# Patient Record
Sex: MALE | Race: WHITE | NOT HISPANIC OR LATINO | Employment: UNEMPLOYED | URBAN - METROPOLITAN AREA
[De-identification: names, ages, dates, MRNs, and addresses within clinical notes are randomized per-mention and may not be internally consistent; named-entity substitution may affect disease eponyms.]

---

## 2017-01-31 ENCOUNTER — HOSPITAL ENCOUNTER (EMERGENCY)
Facility: HOSPITAL | Age: 62
Discharge: HOME | End: 2017-01-31
Attending: EMERGENCY MEDICINE
Payer: MEDICARE

## 2017-01-31 VITALS
OXYGEN SATURATION: 100 % | RESPIRATION RATE: 12 BRPM | TEMPERATURE: 98 F | WEIGHT: 170 LBS | DIASTOLIC BLOOD PRESSURE: 80 MMHG | BODY MASS INDEX: 25.76 KG/M2 | HEART RATE: 82 BPM | SYSTOLIC BLOOD PRESSURE: 128 MMHG | HEIGHT: 68 IN

## 2017-01-31 DIAGNOSIS — S09.90XA CLOSED HEAD INJURY: ICD-10-CM

## 2017-01-31 DIAGNOSIS — S00.83XA CONTUSION OF FACE, INITIAL ENCOUNTER: Primary | ICD-10-CM

## 2017-01-31 PROBLEM — F10.20 ALCOHOL USE DISORDER, SEVERE, DEPENDENCE: Status: ACTIVE | Noted: 2017-01-31

## 2017-01-31 PROBLEM — F10.929 ALCOHOLIC INTOXICATION WITH COMPLICATION: Status: ACTIVE | Noted: 2017-01-31

## 2017-01-31 LAB
ALBUMIN SERPL BCP-MCNC: 4.3 G/DL
ALP SERPL-CCNC: 58 U/L
ALT SERPL W/O P-5'-P-CCNC: 12 U/L
ANION GAP SERPL CALC-SCNC: 10 MMOL/L
AST SERPL-CCNC: 25 U/L
BASOPHILS # BLD AUTO: 0.04 K/UL
BASOPHILS NFR BLD: 0.6 %
BILIRUB SERPL-MCNC: 0.3 MG/DL
BUN SERPL-MCNC: 12 MG/DL
CALCIUM SERPL-MCNC: 9 MG/DL
CHLORIDE SERPL-SCNC: 107 MMOL/L
CO2 SERPL-SCNC: 26 MMOL/L
CREAT SERPL-MCNC: 0.9 MG/DL
DIFFERENTIAL METHOD: ABNORMAL
EOSINOPHIL # BLD AUTO: 0.4 K/UL
EOSINOPHIL NFR BLD: 6.6 %
ERYTHROCYTE [DISTWIDTH] IN BLOOD BY AUTOMATED COUNT: 12.5 %
EST. GFR  (AFRICAN AMERICAN): >60 ML/MIN/1.73 M^2
EST. GFR  (NON AFRICAN AMERICAN): >60 ML/MIN/1.73 M^2
GLUCOSE SERPL-MCNC: 88 MG/DL
HCT VFR BLD AUTO: 49.5 %
HGB BLD-MCNC: 16.9 G/DL
LYMPHOCYTES # BLD AUTO: 2.1 K/UL
LYMPHOCYTES NFR BLD: 31.3 %
MAGNESIUM SERPL-MCNC: 2.2 MG/DL
MCH RBC QN AUTO: 33.1 PG
MCHC RBC AUTO-ENTMCNC: 34.1 %
MCV RBC AUTO: 97 FL
MONOCYTES # BLD AUTO: 0.3 K/UL
MONOCYTES NFR BLD: 4.5 %
NEUTROPHILS # BLD AUTO: 3.8 K/UL
NEUTROPHILS NFR BLD: 57 %
PLATELET # BLD AUTO: 204 K/UL
PMV BLD AUTO: 11.1 FL
POTASSIUM SERPL-SCNC: 4.5 MMOL/L
PROT SERPL-MCNC: 8.1 G/DL
RBC # BLD AUTO: 5.1 M/UL
SODIUM SERPL-SCNC: 143 MMOL/L
WBC # BLD AUTO: 6.67 K/UL

## 2017-01-31 PROCEDURE — 96365 THER/PROPH/DIAG IV INF INIT: CPT

## 2017-01-31 PROCEDURE — 63600175 PHARM REV CODE 636 W HCPCS: Performed by: EMERGENCY MEDICINE

## 2017-01-31 PROCEDURE — 90715 TDAP VACCINE 7 YRS/> IM: CPT | Performed by: EMERGENCY MEDICINE

## 2017-01-31 PROCEDURE — 85025 COMPLETE CBC W/AUTO DIFF WBC: CPT

## 2017-01-31 PROCEDURE — 99284 EMERGENCY DEPT VISIT MOD MDM: CPT | Mod: 25

## 2017-01-31 PROCEDURE — 96372 THER/PROPH/DIAG INJ SC/IM: CPT

## 2017-01-31 PROCEDURE — 83735 ASSAY OF MAGNESIUM: CPT

## 2017-01-31 PROCEDURE — 80053 COMPREHEN METABOLIC PANEL: CPT

## 2017-01-31 PROCEDURE — 25000003 PHARM REV CODE 250: Performed by: EMERGENCY MEDICINE

## 2017-01-31 PROCEDURE — 90792 PSYCH DIAG EVAL W/MED SRVCS: CPT | Mod: GC,,, | Performed by: PSYCHIATRY & NEUROLOGY

## 2017-01-31 PROCEDURE — 90471 IMMUNIZATION ADMIN: CPT | Performed by: EMERGENCY MEDICINE

## 2017-01-31 PROCEDURE — 96366 THER/PROPH/DIAG IV INF ADDON: CPT

## 2017-01-31 RX ORDER — DIAZEPAM 10 MG/2ML
5 INJECTION INTRAMUSCULAR
Status: COMPLETED | OUTPATIENT
Start: 2017-01-31 | End: 2017-01-31

## 2017-01-31 RX ADMIN — DIAZEPAM 5 MG: 5 INJECTION, SOLUTION INTRAMUSCULAR; INTRAVENOUS at 12:01

## 2017-01-31 RX ADMIN — BACITRACIN, NEOMYCIN, POLYMYXIN B 1 EACH: 400; 3.5; 5 OINTMENT TOPICAL at 12:01

## 2017-01-31 RX ADMIN — CLOSTRIDIUM TETANI TOXOID ANTIGEN (FORMALDEHYDE INACTIVATED), CORYNEBACTERIUM DIPHTHERIAE TOXOID ANTIGEN (FORMALDEHYDE INACTIVATED), BORDETELLA PERTUSSIS TOXOID ANTIGEN (GLUTARALDEHYDE INACTIVATED), BORDETELLA PERTUSSIS FILAMENTOUS HEMAGGLUTININ ANTIGEN (FORMALDEHYDE INACTIVATED), BORDETELLA PERTUSSIS PERTACTIN ANTIGEN, AND BORDETELLA PERTUSSIS FIMBRIAE 2/3 ANTIGEN 0.5 ML: 5; 2; 2.5; 5; 3; 5 INJECTION, SUSPENSION INTRAMUSCULAR at 12:01

## 2017-01-31 RX ADMIN — FOLIC ACID: 5 INJECTION, SOLUTION INTRAMUSCULAR; INTRAVENOUS; SUBCUTANEOUS at 12:01

## 2017-01-31 NOTE — ED PROVIDER NOTES
Encounter Date: 1/31/2017    SCRIBE #1 NOTE: I, Hosea Yesenia, am scribing for, and in the presence of, Ramana Cox MD. Other sections scribed: HPI, ROS.       History     Chief Complaint   Patient presents with    Fall     arrived via N.[facility], called to home for c/o fall, EMS reports intoxicated, bizarre behavior, repeating self, hostile, hx Schizo     Review of patient's allergies indicates:  No Known Allergies  HPI Comments: CC: Fall  HPI: This 61 y.o. male with schizophrenia and Hx of alcohol abuse presents to the ED via EMS for evaluation s/p fall at home this morning. Pt states that he is an alcoholic and got drunk this morning; last drink a few hours ago. Pt states he fell and lost consciousness, hitting R eyebrow and giving himself an abrasion. Pt reports moderate associated pain as well as R upper thoracic back pain from the fall. EMS placed pt in c-collar. EMS reports pt has been displaying strange behavior this morning. He states he is up to date on his tetanus immunization. He denies any chest pain, SOB, abdominal pain, N/V.      The history is provided by the patient and the EMS personnel.     Past Medical History   Diagnosis Date    History of psychiatric hospitalization     Hx of psychiatric care     Psychiatric problem     Suicide attempt     Therapy      No past medical history pertinent negatives.  No past surgical history on file.  History reviewed. No pertinent family history.  Social History   Substance Use Topics    Smoking status: Current Every Day Smoker    Smokeless tobacco: None    Alcohol use Yes      Comment: Heavy, all day drinker; will not quantify how much he drinks; many ED visits for intoxication     Review of Systems   Constitutional: Negative for chills and fever.   HENT: Negative for congestion.         (+) R sided facial pain   Eyes: Negative for pain and visual disturbance.   Respiratory: Negative for cough and shortness of breath.    Cardiovascular: Negative for chest  pain.   Gastrointestinal: Negative for abdominal pain, nausea and vomiting.   Genitourinary: Negative for dysuria and flank pain.   Musculoskeletal: Positive for back pain (R upper thoracic).   Skin: Positive for wound (abrasion to R eyebrow).   Neurological: Positive for syncope.   Psychiatric/Behavioral: Positive for behavioral problems.       Physical Exam   Initial Vitals   BP Pulse Resp Temp SpO2   01/31/17 1138 01/31/17 1138 01/31/17 1138 01/31/17 1138 01/31/17 1138   131/82 110 20 98.3 °F (36.8 °C) 100 %     Physical Exam  The patient was examined specifically for the following:   General:No significant distress, Good color, Warm and dry. Head and neck:Scalp atraumatic, Neck supple. Neurological:Appropriate conversation, Gross motor deficits. Eyes:Conjugate gaze, Clear corneas. ENT: No epistaxis. Cardiac: Regular rate and rhythm, Grossly normal heart tones. Pulmonary: Wheezing, Rales. Gastrointestinal: Abdominal tenderness, Abdominal distention. Musculoskeletal: Extremity deformity, Apparent pain with range of motion of the joints. Skin: Rash.   The findings on examination were normal except for the following: Patient has a superficial abrasion of the right brow.  There is a small amount of crusted blood there.  The patient's heart rate in the 110.  The patient is awake alert bright.  He is appropriate in conversation.  He does not appear to be somnolent or lethargic.  Mental status examination, cranial nerves, motor and sensory examination are normal.  There is no midline cervical tenderness.  The patient is wearing a cervical collar.  The patient was cleared clinically.  There is no extremity chest abdomen pelvis or spinal tenderness.  ED Course   Procedures  Labs Reviewed   CBC W/ AUTO DIFFERENTIAL - Abnormal; Notable for the following:        Result Value    MCH 33.1 (*)     All other components within normal limits   COMPREHENSIVE METABOLIC PANEL   MAGNESIUM         Medical decision making: Given the  above, this patient fell striking his head with loss of consciousness.  The patient had no midline cervical tenderness.  He was awake alert and bright in the emergency room.  He did not appear to be psychotic suicidal or homicidal.  The patient was not somnolent.  He is in no apparent distress.  There is no evidence of significant chest abdomen extremity or spinal trauma.  I will discharge this patient outpatient evaluation and treatment.  His magnesium is normal.  He is an alcoholic.  He was treated with IV multivitamins.                  Scribe Attestation:   Scribe #1: I performed the above scribed service and the documentation accurately describes the services I performed. I attest to the accuracy of the note.    Attending Attestation:           Physician Attestation for Scribe:  Physician Attestation Statement for Scribe #1: I, Ramana Cox MD, reviewed documentation, as scribed by Hosea Patterson in my presence, and it is both accurate and complete.                 ED Course     Clinical Impression:   The primary encounter diagnosis was Contusion of face, initial encounter. A diagnosis of Closed head injury was also pertinent to this visit.          Ramana Cox MD  02/03/17 9977

## 2017-01-31 NOTE — DISCHARGE INSTRUCTIONS
Please return immediately if you get worse or if new problems develop.  Rest.  Please make an appointment to see a primary care doctor this week.  Please try to gradually reduce your alcohol consumption.

## 2017-01-31 NOTE — CONSULTS
"[facility]  Psychiatry  Consult Note    Patient Name: Timoteo Murrell  MRN: [id number]   Code Status: No Order  Admission Date: 1/31/2017  Hospital Length of Stay: 0 days  Attending Physician: Ramana Cox MD  Primary Care Provider: Primary Doctor No    Current Legal Status: N/A    Patient information was obtained from patient and ER records.   Consults  Subjective:     Principal Problem:<principal problem not specified>    Chief Complaint:  Fall at home     HPI: Patient Timoteo Murrell presents to clinic after a fall.  He says that he brought himself in but somehow, EMS was called.  Patient is currently a difficult and poor historian and is clinically intoxicated.  He is rude at times and not answering questions.  States that he drinks but will not quantify.  States that he tripped at home "over Antabuse".  He admits to being depressed but again will not quantify.  Sleep is poor.  Denies any previous hien.  Admits to being an anxious person.  Denies any psychosis.  States that he tried to cut his wrist in the past and if I don't stop asking him questions "I'll but it again".  When asked again, he states that he is not suicidal and does not want to go to a psychiatric facility.  States that he was last hospitalized at [facility] in [place] (which changed names and privatized a few years ago).  Does not want rehab or help with drinking.  No desire to quit.  States that he follows up with [facility] and sees Dr. Alcantara.  The only medication that he can recall taking is Antabuse (likely not compliant).  Oriented to name and "hospital".  Not able to provide month or year.  Only able to provide month of his birth.    Patient History      Medical Past Medical History Date Comments Source    as of 1/31/2017 History of psychiatric hospitalization   Provider    Hx of psychiatric care   Provider    Psychiatric problem   Provider    Suicide attempt   Provider    Therapy   Provider    " "     Surgical    Past Surgical History    Patient provided no pertinent surgical history.      Family **None**    as of 1/31/2017      Tobacco Use Smoking Status Source Types Packs/day Years Used Comments Smoking Start Date Smoking Quit Date Smokeless Tobacco Status Smokeless Tobacco Quit Date    as of 1/31/2017 Current Every Day Smoker Provider          Unknown       Alcohol Use Alcohol Use Source Drinks/Week Alcohol/Wk Comments    as of 1/31/2017 Yes Provider    Heavy, all day drinker; will not quantify how much he drinks; many ED visits for intoxication      Drug Use Drug Use Source Types Frequency Comments    as of 1/31/2017 No Provider     denies      Sexual Activity Sexually Active Source Birth Control Partners Comments    as of 1/31/2017  Provider           Social ADL ADL Question Response Comments Source    as of 1/31/2017 Patient feels they ought to cut down on drinking/drug use Not Asked  Provider    Patient annoyed by others criticizing their drinking/drug use Not Asked  Provider    Patient has felt bad or guilty about drinking/drug use Not Asked  Provider    Patient has had a drink/used drugs as an eye opener in the AM Not Asked  Provider      Social Doc **None**    as of 1/31/2017      Occupational Occupation Employer Comments Source    as of 1/31/2017 ""   Provider      Socioeconomic Marital Status Spouse Name Num of Children Years Education Source    as of 1/31/2017 Single    Provider    Preferred Language Ethnicity Race    English /White White      Additional Pertinent History Q A Comments    as of 1/31/2017 Place in Birth [place]      Number of Siblings      Raised by      Childhood Trauma      Financial Status disabled     Highest Level of Education      Lives with alone possibly group home    Lives in group home     Criminal History of      Legal Involvement      Does patient have access to a firearm?       Service      Primary Leisure Activity      Spirituality " "non-practicing     Caffeine Use clinically insignificant        Review of patient's allergies indicates:  No Known Allergies    No current facility-administered medications on file prior to encounter.      Current Outpatient Prescriptions on File Prior to Encounter   Medication Sig    buPROPion (WELLBUTRIN XL) 150 MG TB24 tablet Take 150 mg by mouth once daily.    trazodone (DESYREL) 50 MG tablet Take 50 mg by mouth every evening.     Psychotherapeutics     None        Review of Systems   Constitutional: Positive for fatigue.   Musculoskeletal: Positive for neck pain.   Psychiatric/Behavioral: Positive for dysphoric mood.     Objective:     Vital Signs (Most Recent):  Temp: 98.3 °F (36.8 °C) (01/31/17 1138)  Pulse: 110 (01/31/17 1138)  Resp: 20 (01/31/17 1138)  BP: 131/82 (01/31/17 1138)  SpO2: 100 % (01/31/17 1138) Vital Signs (24h Range):  Temp:  [98.3 °F (36.8 °C)] 98.3 °F (36.8 °C)  Pulse:  [110] 110  Resp:  [20] 20  SpO2:  [100 %] 100 %  BP: (131)/(82) 131/82     Height: 5' 8" (172.7 cm)  Weight: 77.1 kg (170 lb)  Body mass index is 25.85 kg/(m^2).    No intake or output data in the 24 hours ending 01/31/17 1356    Physical Exam   Psychiatric:   Appearance: Disheveled and Malodorous, Behavior:  uncooperative             , Psychomotor: Psychomotor Retardation              , Speech:  soft, Mood:  dysphoric, Affect:  agitated , Thought Process:  concrete, Associations: intact, Thought Content:  Denies suicidal/homicidal/psychosis     , Memory:  Impaired to some degree, Attention Span and Concentration:  Decreased and Easily distracted, Fund of Knowledge:  Impaired and Vocabulary appropriate , Estimate of Intelligence:  Unable to determine, Language: no abnormalities, Insight/Judgement:  Poor, Cognition:  impaired  able to perform serial 7s: No  able to spell "world" backwards: No  appropriate interpretation of proverbs: No  appropriate identification of similies: No, Orientation:  person and place        "   Significant Labs: Last 24 Hours:   Recent Lab Results     None          Significant Imaging: I have reviewed all pertinent imaging results/findings within the past 24 hours.    Assessment/Plan:     Alcohol use disorder, severe, dependence  Patient Timoteo Murrell has a longstanding alcoholic history according to prior ED records.  He does not wish for rehab at this time.  Replenish thiamine, folic acid, MVI.  Provide rehab resources prior to discharge.  Detox if necessary with benzodiazepine taper.    Alcoholic intoxication with complication  Will need reassessment for suicidality/homicidality/grave disability when clinically sober.  If so, PEC and place 1:1 sitter and seek acute inpatient psychiatric facility when medically cleared.  Suspect that it will take a few hours for his alcohol levels to drop enough for him to be clinically sober.    Utilize Haldol 5mg/Ativan 2mg/Benadryl 50mg PO or IM as needed for psychotic agitation or non-redirectable behaviors.  Utilize restraints PRN patient and/or staff safety.      Saman Frias MD   Psychiatry  [facility] [place]

## 2017-01-31 NOTE — SUBJECTIVE & OBJECTIVE
"Patient History      Medical Past Medical History Date Comments Source    as of 1/31/2017 History of psychiatric hospitalization   Provider    Hx of psychiatric care   Provider    Psychiatric problem   Provider    Suicide attempt   Provider    Therapy   Provider         Surgical    Past Surgical History    Patient provided no pertinent surgical history.      Family **None**    as of 1/31/2017      Tobacco Use Smoking Status Source Types Packs/day Years Used Comments Smoking Start Date Smoking Quit Date Smokeless Tobacco Status Smokeless Tobacco Quit Date    as of 1/31/2017 Current Every Day Smoker Provider          Unknown       Alcohol Use Alcohol Use Source Drinks/Week Alcohol/Wk Comments    as of 1/31/2017 Yes Provider    Heavy, all day drinker; will not quantify how much he drinks; many ED visits for intoxication      Drug Use Drug Use Source Types Frequency Comments    as of 1/31/2017 No Provider     denies      Sexual Activity Sexually Active Source Birth Control Partners Comments    as of 1/31/2017  Provider           Social ADL ADL Question Response Comments Source    as of 1/31/2017 Patient feels they ought to cut down on drinking/drug use Not Asked  Provider    Patient annoyed by others criticizing their drinking/drug use Not Asked  Provider    Patient has felt bad or guilty about drinking/drug use Not Asked  Provider    Patient has had a drink/used drugs as an eye opener in the AM Not Asked  Provider      Social Doc **None**    as of 1/31/2017      Occupational Occupation Employer Comments Source    as of 1/31/2017 ""   Provider      Socioeconomic Marital Status Spouse Name Num of Children Years Education Source    as of 1/31/2017 Single    Provider    Preferred Language Ethnicity Race    English /White White      Additional Pertinent History Q A Comments    as of 1/31/2017 Place in Birth Order      Number of Siblings      Raised by      Childhood Trauma      Financial Status disabled     " "Highest Level of Education      Lives with alone possibly group home    Lives in group home     Criminal History of      Legal Involvement      Does patient have access to a firearm?       Service      Primary Leisure Activity      Spirituality non-practicing     Caffeine Use clinically insignificant        Review of patient's allergies indicates:  No Known Allergies    No current facility-administered medications on file prior to encounter.      Current Outpatient Prescriptions on File Prior to Encounter   Medication Sig    buPROPion (WELLBUTRIN XL) 150 MG TB24 tablet Take 150 mg by mouth once daily.    trazodone (DESYREL) 50 MG tablet Take 50 mg by mouth every evening.     Psychotherapeutics     None        Review of Systems   Constitutional: Positive for fatigue.   Musculoskeletal: Positive for neck pain.   Psychiatric/Behavioral: Positive for dysphoric mood.     Objective:     Vital Signs (Most Recent):  Temp: 98.3 °F (36.8 °C) (01/31/17 1138)  Pulse: 110 (01/31/17 1138)  Resp: 20 (01/31/17 1138)  BP: 131/82 (01/31/17 1138)  SpO2: 100 % (01/31/17 1138) Vital Signs (24h Range):  Temp:  [98.3 °F (36.8 °C)] 98.3 °F (36.8 °C)  Pulse:  [110] 110  Resp:  [20] 20  SpO2:  [100 %] 100 %  BP: (131)/(82) 131/82     Height: 5' 8" (172.7 cm)  Weight: 77.1 kg (170 lb)  Body mass index is 25.85 kg/(m^2).    No intake or output data in the 24 hours ending 01/31/17 1356    Physical Exam   Psychiatric:   Appearance: Disheveled and Malodorous, Behavior:  uncooperative             , Psychomotor: Psychomotor Retardation              , Speech:  soft, Mood:  dysphoric, Affect:  agitated , Thought Process:  concrete, Associations: intact, Thought Content:  Denies suicidal/homicidal/psychosis     , Memory:  Impaired to some degree, Attention Span and Concentration:  Decreased and Easily distracted, Fund of Knowledge:  Impaired and Vocabulary appropriate , Estimate of Intelligence:  Unable to determine, Language: no " "abnormalities, Insight/Judgement:  Poor, Cognition:  impaired  able to perform serial 7s: No  able to spell "world" backwards: No  appropriate interpretation of proverbs: No  appropriate identification of similies: No, Orientation:  person and place          Significant Labs: Last 24 Hours:   Recent Lab Results     None          Significant Imaging: I have reviewed all pertinent imaging results/findings within the past 24 hours.  "

## 2017-01-31 NOTE — ASSESSMENT & PLAN NOTE
Patient Timoteo Murrell has a longstanding alcoholic history according to prior ED records.  He does not wish for rehab at this time.  Replenish thiamine, folic acid, MVI.  Provide rehab resources prior to discharge.  Detox if necessary with benzodiazepine taper.

## 2017-01-31 NOTE — ASSESSMENT & PLAN NOTE
Will need reassessment for suicidality/homicidality/grave disability when clinically sober.  If so, PEC and place 1:1 sitter and seek acute inpatient psychiatric facility when medically cleared.  Suspect that it will take a few hours for his alcohol levels to drop enough for him to be clinically sober.

## 2017-01-31 NOTE — ED AVS SNAPSHOT
[facility]  [address] [place] [address]               Timoteo Murrell   2017 11:25 AM   ED    Description:  Male : 1955   Department:  [facility]           Your Care was Coordinated By:     Provider Role From To    Ramana Cox MD Attending Provider 17 1136 --      Reason for Visit     Fall           Diagnoses this Visit        Comments    Contusion of face, initial encounter    -  Primary     Closed head injury           ED Disposition     None           To Do List           Follow-up Information     Follow up with Azikiwe K Lombard, MD In 3 days.    Specialty:  Family Medicine    Contact information:    [address]  [place] [address]  [phone]        [facility]     [facility] Care [facility] -  Assistance  Registered nurses in the [facility] provide clinical advisement, health education, appointment booking, and other advisory services.  Call for this free service at [phone].             Medications           Message regarding Medications     Verify the changes and/or additions to your medication regime listed below are the same as discussed with your clinician today.  If any of these changes or additions are incorrect, please notify your healthcare provider.        These medications were administered today        Dose Freq    sodium chloride 0.9% 1,000 mL with mvi, adult no.4 with vit K 3,300 unit- 150 mcg/10 mL 10 mL, thiamine 100 mg, folic acid 1 mg infusion  Once    Sig: Inject into the vein once.    Class: Normal    Route: Intravenous    diazePAM injection 5 mg 5 mg ED 1 Time    Sig: Inject 1 mL (5 mg total) into the muscle ED 1 Time.    Class: Normal    Route: Intramuscular    neomycin-bacitracnZn-polymyxnB packet 1 each 1 packet ED 1 Time    Sig: Apply 1 each topically ED 1 Time.    Class: Normal    Route: Topical (Top)    Tdap vaccine injection 0.5 mL 0.5 mL Once    Sig: Inject  "0.5 mLs into the muscle once.    Class: Normal    Route: Intramuscular           Verify that the below list of medications is an accurate representation of the medications you are currently taking.  If none reported, the list may be blank. If incorrect, please contact your healthcare provider. Carry this list with you in case of emergency.           Current Medications     buPROPion (WELLBUTRIN XL) 150 MG TB24 tablet Take 150 mg by mouth once daily.    trazodone (DESYREL) 50 MG tablet Take 50 mg by mouth every evening.           Clinical Reference Information           Your Vitals Were     BP Pulse Temp Resp Height Weight    131/82 (BP Location: Left arm, Patient Position: Sitting) 110 98.3 °F (36.8 °C) (Oral) 20 5' 8" (1.727 m) 77.1 kg (170 lb)    SpO2 BMI             100% 25.85 kg/m2         Allergies as of 1/31/2017     No Known Allergies      Immunizations Administered on Date of Encounter - 1/31/2017     Name Date Dose VIS Date Route    TDAP 1/31/2017 0.5 mL 2/24/2015 Intramuscular      ED Micro, Lab, POCT     Start Ordered       Status Ordering Provider    01/31/17 1203 01/31/17 1203  Comprehensive metabolic panel  STAT      Final result     01/31/17 1203 01/31/17 1203  Magnesium  STAT      Final result     01/31/17 1203 01/31/17 1203  CBC auto differential  STAT      Final result       ED Imaging Orders     Start Ordered       Status Ordering Provider    01/31/17 1203 01/31/17 1203  CT Head Without Contrast  1 time imaging      Edited Result - FINAL         Discharge Instructions       Please return immediately if you get worse or if new problems develop.  Rest.  Please make an appointment to see a primary care doctor this week.  Please try to gradually reduce your alcohol consumption.    [facility] Sign-Up     Activating your [facility] account is as easy as 1-2-3!     1) Visit [email or web address], select Sign Up Now, enter this activation code and your date of birth, then select Next.  [email or web address]  Expires: " 3/17/2017  3:07 PM      2) Create a username and password to use when you visit [facility] in the future and select a security question in case you lose your password and select Next.    3) Enter your e-mail address and click Sign Up!    Additional Information  If you have questions, please e-mail [email or web address] or call [phone] to talk to our [facility] staff. Remember, [facility] is NOT to be used for urgent needs. For medical emergencies, dial 911.         Smoking Cessation     If you would like to quit smoking:   You may be eligible for free services if you are a [place] resident and started smoking cigarettes before September 1, 1988.  Call the Smoking Cessation Trust (SCT) toll free at (935) 936-8541 or (094) 407-9649.   Call 7-550-QUIT-NOW if you do not meet the above criteria.             [facility] complies with applicable Federal civil rights laws and does not discriminate on the basis of race, color, national origin, age, disability, or sex.        Language Assistance Services     ATTENTION: Language assistance services are available, free of charge. Please call [phone].      ATENCIÓN: Si habla español, tiene a ghosh disposición servicios gratuitos de asistencia lingüística. Llame al [phone].     CHÚ Ý: N?u b?n nói Ti?ng Vi?t, có các d?ch v? h? tr? ngôn ng? mi?n phí dành cho b?n. G?i s? [phone].

## 2017-01-31 NOTE — ED TRIAGE NOTES
Pt presents to the ER via EMS for a fall at his group home. Pt was also sent in for bizarre behavior

## 2022-02-04 ENCOUNTER — HOSPITAL ENCOUNTER (EMERGENCY)
Facility: HOSPITAL | Age: 67
Discharge: PSYCHIATRIC OR SUBSTANCE USE FACILITY | End: 2022-02-05
Attending: EMERGENCY MEDICINE
Payer: MEDICARE

## 2022-02-04 DIAGNOSIS — F22 DELUSIONS: ICD-10-CM

## 2022-02-04 DIAGNOSIS — F29 PSYCHOSIS, UNSPECIFIED PSYCHOSIS TYPE: ICD-10-CM

## 2022-02-04 DIAGNOSIS — F20.9 SCHIZOPHRENIA, UNSPECIFIED TYPE: ICD-10-CM

## 2022-02-04 DIAGNOSIS — R45.1 AGITATION: Primary | ICD-10-CM

## 2022-02-04 LAB
ALBUMIN SERPL BCP-MCNC: 4.4 G/DL (ref 3.5–5.2)
ALP SERPL-CCNC: 47 U/L (ref 55–135)
ALT SERPL W/O P-5'-P-CCNC: 18 U/L (ref 10–44)
AMPHET+METHAMPHET UR QL: NEGATIVE
ANION GAP SERPL CALC-SCNC: 11 MMOL/L (ref 8–16)
APAP SERPL-MCNC: <3 UG/ML (ref 10–20)
AST SERPL-CCNC: 31 U/L (ref 10–40)
BARBITURATES UR QL SCN>200 NG/ML: NEGATIVE
BASOPHILS # BLD AUTO: 0.11 K/UL (ref 0–0.2)
BASOPHILS NFR BLD: 1.9 % (ref 0–1.9)
BENZODIAZ UR QL SCN>200 NG/ML: NEGATIVE
BILIRUB SERPL-MCNC: 0.5 MG/DL (ref 0.1–1)
BILIRUB UR QL STRIP: NEGATIVE
BUN SERPL-MCNC: 7 MG/DL (ref 8–23)
BZE UR QL SCN: ABNORMAL
CALCIUM SERPL-MCNC: 9.1 MG/DL (ref 8.7–10.5)
CANNABINOIDS UR QL SCN: NEGATIVE
CHLORIDE SERPL-SCNC: 102 MMOL/L (ref 95–110)
CLARITY UR: CLEAR
CO2 SERPL-SCNC: 30 MMOL/L (ref 23–29)
COLOR UR: YELLOW
CREAT SERPL-MCNC: 0.8 MG/DL (ref 0.5–1.4)
CREAT UR-MCNC: 57 MG/DL (ref 23–375)
CTP QC/QA: YES
DIFFERENTIAL METHOD: ABNORMAL
EOSINOPHIL # BLD AUTO: 0.4 K/UL (ref 0–0.5)
EOSINOPHIL NFR BLD: 6.7 % (ref 0–8)
ERYTHROCYTE [DISTWIDTH] IN BLOOD BY AUTOMATED COUNT: 12 % (ref 11.5–14.5)
EST. GFR  (AFRICAN AMERICAN): >60 ML/MIN/1.73 M^2
EST. GFR  (NON AFRICAN AMERICAN): >60 ML/MIN/1.73 M^2
ETHANOL SERPL-MCNC: 259 MG/DL
ETHANOL SERPL-MCNC: 28 MG/DL
GLUCOSE SERPL-MCNC: 87 MG/DL (ref 70–110)
GLUCOSE UR QL STRIP: NEGATIVE
HCT VFR BLD AUTO: 47.2 % (ref 40–54)
HGB BLD-MCNC: 15.4 G/DL (ref 14–18)
HGB UR QL STRIP: NEGATIVE
IMM GRANULOCYTES # BLD AUTO: 0.01 K/UL (ref 0–0.04)
IMM GRANULOCYTES NFR BLD AUTO: 0.2 % (ref 0–0.5)
KETONES UR QL STRIP: NEGATIVE
LEUKOCYTE ESTERASE UR QL STRIP: NEGATIVE
LYMPHOCYTES # BLD AUTO: 2.2 K/UL (ref 1–4.8)
LYMPHOCYTES NFR BLD: 39.3 % (ref 18–48)
MCH RBC QN AUTO: 33.3 PG (ref 27–31)
MCHC RBC AUTO-ENTMCNC: 32.6 G/DL (ref 32–36)
MCV RBC AUTO: 102 FL (ref 82–98)
METHADONE UR QL SCN>300 NG/ML: NEGATIVE
MONOCYTES # BLD AUTO: 0.4 K/UL (ref 0.3–1)
MONOCYTES NFR BLD: 6.2 % (ref 4–15)
NEUTROPHILS # BLD AUTO: 2.6 K/UL (ref 1.8–7.7)
NEUTROPHILS NFR BLD: 45.7 % (ref 38–73)
NITRITE UR QL STRIP: NEGATIVE
NRBC BLD-RTO: 0 /100 WBC
OPIATES UR QL SCN: NEGATIVE
PCP UR QL SCN>25 NG/ML: NEGATIVE
PH UR STRIP: 7 [PH] (ref 5–8)
PLATELET # BLD AUTO: 230 K/UL (ref 150–450)
PMV BLD AUTO: 10.1 FL (ref 9.2–12.9)
POTASSIUM SERPL-SCNC: 3.9 MMOL/L (ref 3.5–5.1)
PROT SERPL-MCNC: 8.1 G/DL (ref 6–8.4)
PROT UR QL STRIP: NEGATIVE
RBC # BLD AUTO: 4.63 M/UL (ref 4.6–6.2)
SARS-COV-2 RDRP RESP QL NAA+PROBE: NEGATIVE
SODIUM SERPL-SCNC: 143 MMOL/L (ref 136–145)
SP GR UR STRIP: 1.01 (ref 1–1.03)
TOXICOLOGY INFORMATION: ABNORMAL
TSH SERPL DL<=0.005 MIU/L-ACNC: 0.51 UIU/ML (ref 0.4–4)
URN SPEC COLLECT METH UR: NORMAL
UROBILINOGEN UR STRIP-ACNC: NEGATIVE EU/DL
WBC # BLD AUTO: 5.68 K/UL (ref 3.9–12.7)

## 2022-02-04 PROCEDURE — 80307 DRUG TEST PRSMV CHEM ANLYZR: CPT | Performed by: EMERGENCY MEDICINE

## 2022-02-04 PROCEDURE — 81003 URINALYSIS AUTO W/O SCOPE: CPT | Mod: 59 | Performed by: EMERGENCY MEDICINE

## 2022-02-04 PROCEDURE — 82077 ASSAY SPEC XCP UR&BREATH IA: CPT | Performed by: EMERGENCY MEDICINE

## 2022-02-04 PROCEDURE — G0427 PR INPT TELEHEALTH CON 70/>M: ICD-10-PCS | Mod: 95,,, | Performed by: PSYCHIATRY & NEUROLOGY

## 2022-02-04 PROCEDURE — G0427 INPT/ED TELECONSULT70: HCPCS | Mod: 95,,, | Performed by: PSYCHIATRY & NEUROLOGY

## 2022-02-04 PROCEDURE — 84443 ASSAY THYROID STIM HORMONE: CPT | Performed by: EMERGENCY MEDICINE

## 2022-02-04 PROCEDURE — 80053 COMPREHEN METABOLIC PANEL: CPT | Performed by: EMERGENCY MEDICINE

## 2022-02-04 PROCEDURE — U0002 COVID-19 LAB TEST NON-CDC: HCPCS | Performed by: EMERGENCY MEDICINE

## 2022-02-04 PROCEDURE — 80143 DRUG ASSAY ACETAMINOPHEN: CPT | Performed by: EMERGENCY MEDICINE

## 2022-02-04 PROCEDURE — 99285 EMERGENCY DEPT VISIT HI MDM: CPT

## 2022-02-04 PROCEDURE — 85025 COMPLETE CBC W/AUTO DIFF WBC: CPT | Performed by: EMERGENCY MEDICINE
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
## 2022-02-04 NOTE — ED NOTES
Spoke with 's office to notify them the patient has been PEC and a copy was given to patient registration to be scanned into the patient's chart.

## 2022-02-05 VITALS
OXYGEN SATURATION: 97 % | RESPIRATION RATE: 16 BRPM | DIASTOLIC BLOOD PRESSURE: 88 MMHG | HEIGHT: 68 IN | HEART RATE: 97 BPM | WEIGHT: 155 LBS | SYSTOLIC BLOOD PRESSURE: 141 MMHG | TEMPERATURE: 98 F | BODY MASS INDEX: 23.49 KG/M2

## 2022-02-05 NOTE — ED NOTES
65 yo male to Ed from [facility] for agitation and psych eval. Per report, patient was being aggressive and bothering the other residents. Staff at the facility says his behavior was unusual. Pt does appear to be confused and agitated at this time.

## 2022-02-05 NOTE — ED NOTES
Pt's room secured per protocol. Pt's belongings secured and pt placed in grey gown and yellow socks. Pt being directly monitored by laya Tran at this time. Will continue to monitor pt .

## 2022-02-05 NOTE — ED NOTES
[facility] here for pickup. Security notified. Pt states he will call his family in the morning.

## 2025-05-07 ENCOUNTER — HOSPITAL ENCOUNTER (EMERGENCY)
Facility: HOSPITAL | Age: 70
Discharge: PSYCHIATRIC OR SUBSTANCE USE FACILITY | End: 2025-05-08
Attending: EMERGENCY MEDICINE
Payer: MEDICARE

## 2025-05-07 DIAGNOSIS — F14.90 COCAINE USE: ICD-10-CM

## 2025-05-07 DIAGNOSIS — F10.929 ALCOHOL INTOXICATION: ICD-10-CM

## 2025-05-07 DIAGNOSIS — R45.851 SUICIDAL IDEATION: Primary | ICD-10-CM

## 2025-05-07 DIAGNOSIS — Z00.8 MEDICAL CLEARANCE FOR PSYCHIATRIC ADMISSION: ICD-10-CM

## 2025-05-07 LAB
ABSOLUTE EOSINOPHIL (OHS): 1.32 K/UL
ABSOLUTE MONOCYTE (OHS): 0.49 K/UL (ref 0.3–1)
ABSOLUTE NEUTROPHIL COUNT (OHS): 2.86 K/UL (ref 1.8–7.7)
ALBUMIN SERPL BCP-MCNC: 3.8 G/DL (ref 3.5–5.2)
ALP SERPL-CCNC: 54 UNIT/L (ref 40–150)
ALT SERPL W/O P-5'-P-CCNC: 25 UNIT/L (ref 10–44)
AMPHET UR QL SCN: NEGATIVE
ANION GAP (OHS): 13 MMOL/L (ref 8–16)
APAP SERPL-MCNC: <3 UG/ML (ref 10–20)
AST SERPL-CCNC: 46 UNIT/L (ref 11–45)
BARBITURATE SCN PRESENT UR: NEGATIVE
BASOPHILS # BLD AUTO: 0.13 K/UL
BASOPHILS NFR BLD AUTO: 1.9 %
BENZODIAZ UR QL SCN: NEGATIVE
BILIRUB SERPL-MCNC: 0.3 MG/DL (ref 0.1–1)
BILIRUB UR QL STRIP.AUTO: NEGATIVE
BUN SERPL-MCNC: 19 MG/DL (ref 8–23)
CALCIUM SERPL-MCNC: 8.5 MG/DL (ref 8.7–10.5)
CANNABINOIDS UR QL SCN: NEGATIVE
CHLORIDE SERPL-SCNC: 109 MMOL/L (ref 95–110)
CLARITY UR: CLEAR
CO2 SERPL-SCNC: 24 MMOL/L (ref 23–29)
COCAINE UR QL SCN: ABNORMAL
COLOR UR AUTO: YELLOW
CREAT SERPL-MCNC: 0.8 MG/DL (ref 0.5–1.4)
CREAT UR-MCNC: 182.8 MG/DL (ref 23–375)
ERYTHROCYTE [DISTWIDTH] IN BLOOD BY AUTOMATED COUNT: 12.9 % (ref 11.5–14.5)
ETHANOL SERPL-MCNC: 342 MG/DL
GFR SERPLBLD CREATININE-BSD FMLA CKD-EPI: >60 ML/MIN/1.73/M2
GLUCOSE SERPL-MCNC: 96 MG/DL (ref 70–110)
GLUCOSE UR QL STRIP: NEGATIVE
HCT VFR BLD AUTO: 41.8 % (ref 40–54)
HGB BLD-MCNC: 13.8 GM/DL (ref 14–18)
HGB UR QL STRIP: NEGATIVE
HOLD SPECIMEN: NORMAL
IMM GRANULOCYTES # BLD AUTO: 0.01 K/UL (ref 0–0.04)
IMM GRANULOCYTES NFR BLD AUTO: 0.1 % (ref 0–0.5)
KETONES UR QL STRIP: NEGATIVE
LEUKOCYTE ESTERASE UR QL STRIP: NEGATIVE
LYMPHOCYTES # BLD AUTO: 2.06 K/UL (ref 1–4.8)
MCH RBC QN AUTO: 33.1 PG (ref 27–31)
MCHC RBC AUTO-ENTMCNC: 33 G/DL (ref 32–36)
MCV RBC AUTO: 100 FL (ref 82–98)
METHADONE UR QL SCN: NEGATIVE
NITRITE UR QL STRIP: NEGATIVE
NUCLEATED RBC (/100WBC) (OHS): 0 /100 WBC
OPIATES UR QL SCN: NEGATIVE
PCP UR QL: NEGATIVE
PH UR STRIP: 6 [PH]
PLATELET # BLD AUTO: 186 K/UL (ref 150–450)
PMV BLD AUTO: 10.3 FL (ref 9.2–12.9)
POTASSIUM SERPL-SCNC: 3.7 MMOL/L (ref 3.5–5.1)
PROT SERPL-MCNC: 7.5 GM/DL (ref 6–8.4)
PROT UR QL STRIP: ABNORMAL
RBC # BLD AUTO: 4.17 M/UL (ref 4.6–6.2)
RELATIVE EOSINOPHIL (OHS): 19.2 %
RELATIVE LYMPHOCYTE (OHS): 30 % (ref 18–48)
RELATIVE MONOCYTE (OHS): 7.1 % (ref 4–15)
RELATIVE NEUTROPHIL (OHS): 41.7 % (ref 38–73)
SODIUM SERPL-SCNC: 146 MMOL/L (ref 136–145)
SP GR UR STRIP: 1.03
UROBILINOGEN UR STRIP-ACNC: ABNORMAL EU/DL
WBC # BLD AUTO: 6.87 K/UL (ref 3.9–12.7)

## 2025-05-07 PROCEDURE — 80307 DRUG TEST PRSMV CHEM ANLYZR: CPT | Performed by: EMERGENCY MEDICINE

## 2025-05-07 PROCEDURE — 96372 THER/PROPH/DIAG INJ SC/IM: CPT | Performed by: EMERGENCY MEDICINE

## 2025-05-07 PROCEDURE — 36415 COLL VENOUS BLD VENIPUNCTURE: CPT | Performed by: EMERGENCY MEDICINE

## 2025-05-07 PROCEDURE — 63600175 PHARM REV CODE 636 W HCPCS: Performed by: EMERGENCY MEDICINE

## 2025-05-07 PROCEDURE — 80143 DRUG ASSAY ACETAMINOPHEN: CPT | Performed by: EMERGENCY MEDICINE

## 2025-05-07 PROCEDURE — 82077 ASSAY SPEC XCP UR&BREATH IA: CPT | Performed by: EMERGENCY MEDICINE

## 2025-05-07 PROCEDURE — 81003 URINALYSIS AUTO W/O SCOPE: CPT | Mod: 59 | Performed by: EMERGENCY MEDICINE

## 2025-05-07 PROCEDURE — 80053 COMPREHEN METABOLIC PANEL: CPT | Performed by: EMERGENCY MEDICINE

## 2025-05-07 PROCEDURE — 93005 ELECTROCARDIOGRAM TRACING: CPT

## 2025-05-07 PROCEDURE — 93010 ELECTROCARDIOGRAM REPORT: CPT | Mod: ,,, | Performed by: INTERNAL MEDICINE

## 2025-05-07 PROCEDURE — 85025 COMPLETE CBC W/AUTO DIFF WBC: CPT | Performed by: EMERGENCY MEDICINE

## 2025-05-07 PROCEDURE — 99285 EMERGENCY DEPT VISIT HI MDM: CPT | Mod: 25

## 2025-05-07 RX ORDER — LORAZEPAM 2 MG/ML
2 INJECTION INTRAMUSCULAR ONCE AS NEEDED
Status: DISCONTINUED | OUTPATIENT
Start: 2025-05-07 | End: 2025-05-08 | Stop reason: HOSPADM

## 2025-05-07 RX ORDER — HALOPERIDOL LACTATE 5 MG/ML
5 INJECTION, SOLUTION INTRAMUSCULAR ONCE AS NEEDED
Status: DISCONTINUED | OUTPATIENT
Start: 2025-05-07 | End: 2025-05-08 | Stop reason: HOSPADM

## 2025-05-07 RX ORDER — HALOPERIDOL LACTATE 5 MG/ML
5 INJECTION, SOLUTION INTRAMUSCULAR ONCE AS NEEDED
Status: COMPLETED | OUTPATIENT
Start: 2025-05-07 | End: 2025-05-07

## 2025-05-07 RX ORDER — LORAZEPAM 2 MG/ML
2 INJECTION INTRAMUSCULAR ONCE AS NEEDED
Status: COMPLETED | OUTPATIENT
Start: 2025-05-07 | End: 2025-05-07

## 2025-05-07 RX ADMIN — HALOPERIDOL LACTATE 5 MG: 5 INJECTION, SOLUTION INTRAMUSCULAR at 02:05

## 2025-05-07 RX ADMIN — LORAZEPAM 2 MG: 2 INJECTION INTRAMUSCULAR; INTRAVENOUS at 03:05

## 2025-05-07 NOTE — ED NOTES
"Timoteo Murrell, a 70 y.o. male presents to the ED w/ complaint of suicidal ideation. Pt endorses wish to die by cutting his wrists. Pt BIB [facility] for aggressive behavior and spitting on bystanders. Pt placed into room 12 for evaluation, Dr. Rivera and security at the bedside.      Triage note:  Chief Complaint   Patient presents with    Suicidal     Pt presents to the ED via [facility] for psychiatric evaluation. Pt visibly aggressive in triage, transferred to room 12 for evaluation. Pt states he "does not deserve to live". The patient endorses a plan to cut his wrists.        Review of patient's allergies indicates:  No Known Allergies  Past Medical History:   Diagnosis Date    History of psychiatric hospitalization     Hx of psychiatric care     Psychiatric problem     Suicide attempt     Therapy        "

## 2025-05-07 NOTE — ED PROVIDER NOTES
"Encounter Date: 5/7/2025       History     Chief Complaint   Patient presents with    Suicidal     Pt presents to the ED via [facility] for psychiatric evaluation. Pt visibly aggressive in triage, transferred to room 12 for evaluation. Pt states he "does not deserve to live". The patient endorses a plan to cut his wrists.        [age] year pale past history of SI COVID depression, alcohol abuse presenting today secondary suicidal ideation with plan to cut wrist.  Patient at is psychiatrist and agitated yelling and throwing things.  Stating he is suicidal.  Placed in handcuffs and brought in by police.  The patient answering minimal questions.  States that he was very mean and rude to a very nice lady and he is suicidal wants to cut his wrists.  Otherwise not answering questions.        Review of patient's allergies indicates:  No Known Allergies  Past Medical History:   Diagnosis Date    History of psychiatric hospitalization     Hx of psychiatric care     Psychiatric problem     Suicide attempt     Therapy      History reviewed. No pertinent surgical history.  No family history on file.  Social History[1]  Review of Systems   Unable to perform ROS: Psychiatric disorder       Physical Exam     Initial Vitals   BP Pulse Resp Temp SpO2   05/07/25 1811 05/07/25 1811 05/08/25 0003 05/07/25 1811 05/07/25 1811   (!) 136/91 90 18 97.5 °F (36.4 °C) 95 %      MAP       --                Physical Exam    Nursing note and vitals reviewed.  Constitutional: He appears well-developed and well-nourished.   HENT:   Head: Normocephalic and atraumatic. Mouth/Throat: Oropharynx is clear and moist.   Eyes: EOM are normal. Pupils are equal, round, and reactive to light.   Neck:   Normal range of motion.  Cardiovascular:  Normal rate and regular rhythm.           Pulmonary/Chest: Breath sounds normal. No stridor. No respiratory distress. He has no wheezes.   Abdominal: Abdomen is soft. Bowel sounds are normal. He exhibits no distension. " There is no abdominal tenderness.   Musculoskeletal:         General: No tenderness or edema. Normal range of motion.      Cervical back: Normal range of motion.     Neurological: He is alert and oriented to person, place, and time. He has normal strength. GCS score is 15. GCS eye subscore is 4. GCS verbal subscore is 5. GCS motor subscore is 6.   Skin: Skin is warm and dry. Capillary refill takes less than 2 seconds.   Psychiatric:   Agitated, yelling, threatening, suicidal with plan to cut wrist.         ED Course   Critical Care    Date/Time: 5/15/2025 11:06 AM    Performed by: Ramana Cox MD  Authorized by: Ramana Cox MD  Direct patient critical care time: 22 minutes  Additional history critical care time: 11 minutes  Ordering / reviewing critical care time: 11 minutes  Documentation critical care time: 11 minutes  Consulting other physicians critical care time: 11 minutes  Total critical care time (exclusive of procedural time) : 66 minutes  Critical care time was exclusive of separately billable procedures and treating other patients and teaching time.  Critical care was necessary to treat or prevent imminent or life-threatening deterioration of the following conditions: CNS failure or compromise.  Critical care was time spent personally by me on the following activities: development of treatment plan with patient or surrogate, evaluation of patient's response to treatment, examination of patient, obtaining history from patient or surrogate, ordering and performing treatments and interventions, ordering and review of laboratory studies, re-evaluation of patient's condition and review of old charts.        Labs Reviewed   COMPREHENSIVE METABOLIC PANEL - Abnormal       Result Value    Sodium 146 (*)     Potassium 3.7      Chloride 109      CO2 24      Glucose 96      BUN 19      Creatinine 0.8      Calcium 8.5 (*)     Protein Total 7.5      Albumin 3.8      Bilirubin Total 0.3      ALP 54      AST  "46 (*)     ALT 25      Anion Gap 13      eGFR >60     URINALYSIS, REFLEX TO URINE CULTURE - Abnormal    Color, UA Yellow      Appearance, UA Clear      pH, UA 6.0      Spec Grav UA 1.030      Protein, UA Trace (*)     Glucose, UA Negative      Ketones, UA Negative      Bilirubin, UA Negative      Blood, UA Negative      Nitrites, UA Negative      Urobilinogen, UA 2.0-3.0 (*)     Leukocyte Esterase, UA Negative     DRUG SCREEN PANEL, URINE EMERGENCY - Abnormal    Benzodiazepine, Urine Negative      Methadone, Urine Negative      Cocaine, Urine Presumptive Positive (*)     Opiates, Urine Negative      Barbiturates, Urine Negative      Amphetamines, Urine Negative      THC Negative      Phencyclidine, Urine Negative      Urine Creatinine 182.8      Narrative:     This screen includes the following classes of drugs at the listed cut-off:     Benzodiazepines:        200 ng/ml   Methadone:              300 ng/ml   Cocaine metabolite:     300 ng/ml   Opiates:                300 ng/ml   Barbiturates:           200 ng/ml   Amphetamines:           1000 ng/ml   Marijuana metabs (THC): 50 ng/ml   Phencyclidine (PCP):    25 ng/ml     This is a screening test. If results do not correlate with clinical presentation, then a confirmatory send out test is advised.    This report is intended for use in clinical monitoring and management of patients. It is not intended for use in employment related drug testing."   ALCOHOL,MEDICAL (ETHANOL) - Abnormal    Alcohol, Serum 342 (*)    ACETAMINOPHEN LEVEL - Abnormal    Acetaminophen Level <3.0 (*)    CBC WITH DIFFERENTIAL - Abnormal    WBC 6.87      RBC 4.17 (*)     HGB 13.8 (*)     HCT 41.8       (*)     MCH 33.1 (*)     MCHC 33.0      RDW 12.9      Platelet Count 186      MPV 10.3      Nucleated RBC 0      Neut % 41.7      Lymph % 30.0      Mono % 7.1      Eos % 19.2 (*)     Basophil % 1.9      Imm Grans % 0.1      Neut # 2.86      Lymph # 2.06      Mono # 0.49      Eos # 1.32 (*)  "    Baso # 0.13      Imm Grans # 0.01     ALCOHOL,MEDICAL (ETHANOL) - Abnormal    Alcohol, Serum 185 (*)    ALCOHOL,MEDICAL (ETHANOL) - Abnormal    Alcohol, Serum 88 (*)    CBC W/ AUTO DIFFERENTIAL    Narrative:     The following orders were created for panel order CBC auto differential.  Procedure                               Abnormality         Status                     ---------                               -----------         ------                     CBC with Differential[[id number]]       Abnormal            Final result                 Please view results for these tests on the individual orders.   EXTRA TUBES    Narrative:     The following orders were created for panel order EXTRA TUBES.  Procedure                               Abnormality         Status                     ---------                               -----------         ------                     Light Green Top Hold[[id number]]                            Final result               Light Green Top Hold[[id number]]                            Final result                 Please view results for these tests on the individual orders.   LIGHT GREEN TOP HOLD    Extra Tube Hold for add-ons.     LIGHT GREEN TOP HOLD    Extra Tube Hold for add-ons.     GREY TOP URINE HOLD    Extra Tube Hold for add-ons.       EKG Readings: (Independently Interpreted)   EKG done 16 13 showed normal sinus rhythm rate 94.  No ST elevation.  No major T-wave abnormality.  Normal axis QRS.  QTC is 47.  Nonspecific EKG.       Imaging Results    None          Medications   haloperidol lactate injection 5 mg (has no administration in time range)   LORazepam injection 2 mg (has no administration in time range)   haloperidol lactate injection 5 mg (5 mg Intramuscular Given 5/7/25 1451)   LORazepam injection 2 mg (2 mg Intramuscular Given 5/7/25 1505)     Medical Decision Making  Ddx includes acute psychotic episode, SI/danger to self, HI/danger to others, but also toxidrome,  withdrawal (eg from EtOH or BZD therapy), electrolyte derangement, serotonin syndrome, unusual pathology like anti-NMDA receptor encephalitis, other.  Will get basic screening psychiatric labs on the patient and reassess.  Will sedate patient if necessary.  Patient was placed on a PEC due to suicidal ideation with plan to cut wrist.     Labs notable for cocaine and elevated alcohol level.  Patient signed out to Dr. Garcia at shift change pending repeat ethanol level, reassessment, disposition    Please put in 35 minutes of critical care due to patient having a high risk of respiratory neurological failure that needed sedation secondary to agitation.  Patient has a high resource utilizer needing nursing, ancillary staff, security to help with safety of patient, staff, and myself.   Separate from teaching and exclusive of procedure and ekg time  Includes:  Time at bedside  Time reviewing test results  Time discussing case with staff  Time documenting the medical record  Time spent with family members  Time spent with consults  Management          Amount and/or Complexity of Data Reviewed  Labs: ordered.    Risk  Prescription drug management.    Medical decision making:  This is doctor Cox dictating.  I assumed care this patient at 6:00 a.m..  The patient has a an alcohol level of 185 in the emergency room.  He will be repeated this morning.  It is in the lab now.  I had face time with this patient he is calm and relaxed in his room.  He is appropriate in conversation he ate breakfast.  He is in no distress.  This is doctor Cox dictating.  The patient is repeat ethanol level is 88.  I will place this patient to Psychiatry.              Medically cleared for psychiatry placement: 5/8/2025  9:20 AM                   Clinical Impression:  Final diagnoses:  [F10.929] Alcohol intoxication  [R45.851] Suicidal ideation (Primary)  [Z00.8] Medical clearance for psychiatric admission  [F14.90] Cocaine use          ED  Disposition Condition    Transfer to Psych Facility Stable          ED Prescriptions    None       Follow-up Information    None            Lenin Rivera MD  05/07/25 1904       Ramana Cox MD  05/08/25 0806       Ramana Cox MD  05/08/25 0921         [1]   Social History  Tobacco Use    Smoking status: Every Day   Substance Use Topics    Alcohol use: Yes     Comment: Heavy, all day drinker; will not quantify how much he drinks; many ED visits for intoxication    Drug use: No     Comment: Ramana Salgado MD  05/15/25 1106

## 2025-05-07 NOTE — ED NOTES
Patient removed all clothes. Nurse redirected patient back into bed, but patient refusing to dress.

## 2025-05-08 VITALS
HEIGHT: 69 IN | DIASTOLIC BLOOD PRESSURE: 90 MMHG | BODY MASS INDEX: 22.96 KG/M2 | OXYGEN SATURATION: 98 % | SYSTOLIC BLOOD PRESSURE: 147 MMHG | TEMPERATURE: 98 F | RESPIRATION RATE: 18 BRPM | WEIGHT: 155 LBS | HEART RATE: 105 BPM

## 2025-05-08 LAB
ETHANOL SERPL-MCNC: 185 MG/DL
ETHANOL SERPL-MCNC: 88 MG/DL
OHS QRS DURATION: 86 MS
OHS QTC CALCULATION: 487 MS

## 2025-05-08 PROCEDURE — 82077 ASSAY SPEC XCP UR&BREATH IA: CPT | Performed by: STUDENT IN AN ORGANIZED HEALTH CARE EDUCATION/TRAINING PROGRAM

## 2025-05-08 PROCEDURE — 82077 ASSAY SPEC XCP UR&BREATH IA: CPT | Performed by: EMERGENCY MEDICINE

## 2025-05-08 NOTE — ED NOTES
Assumed care of pt under PEC order. Pt being mostly uncooperative. Pt A/O x 4 w/ ABCs intact, NAD. VSS.     Review of patient's allergies indicates:  No Known Allergies     Patient has verified the spelling of their name and  on armband.   APPEARANCE: Patient is alert, calm, oriented x 4, and does not appear distressed.  SKIN: Skin is normal for race, warm, and dry. Normal skin turgor and mucous membranes moist.  CARDIAC: Normal rate and rhythm, no murmur heard.   RESPIRATORY:Normal rate and effort. Breath sounds clear bilaterally throughout chest. Respirations are equal and unlabored.    GASTRO: Bowel sounds normal, abdomen is soft, no tenderness, and no abdominal distention.  MUSCLE: Full ROM. No bony tenderness or soft tissue tenderness. No obvious deformity.  PERIPHERAL VASCULAR: peripheral pulses present. Normal cap refill. No edema. Warm to touch.  NEURO: 5/5 strength major flexors/extensors bilaterally. Sensory intact to light touch bilaterally. Alleman coma scale: eyes open spontaneously-4, oriented & converses-5, obeys commands-6. No neurological abnormalities.   MENTAL STATUS: awake, alert and aware of environment.  : Voids without complication    ED orders in progress. Pt SR up x 2. Bed in lowest position with wheels locked. Call bell within reach of pt.

## 2025-05-08 NOTE — ED NOTES
Report received from ETHEL Martinez. Pt care assumed. Pt asleep at present. RR even and unlabored. Sitter at bedside for continuous monitoring and q15min checks. Awaiting Ethanol results

## 2025-08-27 ENCOUNTER — HOSPITAL ENCOUNTER (EMERGENCY)
Facility: HOSPITAL | Age: 70
Discharge: HOME | End: 2025-08-27
Attending: EMERGENCY MEDICINE
Payer: MEDICARE

## 2025-08-27 VITALS
WEIGHT: 155 LBS | TEMPERATURE: 99 F | RESPIRATION RATE: 18 BRPM | HEIGHT: 69 IN | BODY MASS INDEX: 22.96 KG/M2 | OXYGEN SATURATION: 96 % | DIASTOLIC BLOOD PRESSURE: 81 MMHG | SYSTOLIC BLOOD PRESSURE: 144 MMHG | HEART RATE: 76 BPM
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
## 2025-08-27 DIAGNOSIS — S09.90XA INJURY OF HEAD, INITIAL ENCOUNTER: ICD-10-CM

## 2025-08-27 DIAGNOSIS — S00.81XA ABRASION OF FACE, INITIAL ENCOUNTER: ICD-10-CM

## 2025-08-27 PROCEDURE — 25000003 PHARM REV CODE 250: Performed by: EMERGENCY MEDICINE

## 2025-08-27 PROCEDURE — 99285 EMERGENCY DEPT VISIT HI MDM: CPT | Mod: 25

## 2025-08-27 RX ADMIN — BACITRACIN ZINC, NEOMYCIN, POLYMYXIN B 1 EACH: 400; 3.5; 5 OINTMENT TOPICAL at 05:08
